# Patient Record
Sex: MALE | URBAN - METROPOLITAN AREA
[De-identification: names, ages, dates, MRNs, and addresses within clinical notes are randomized per-mention and may not be internally consistent; named-entity substitution may affect disease eponyms.]

---

## 2024-04-26 ENCOUNTER — OFFICE VISIT (OUTPATIENT)
Age: 39
End: 2024-04-26

## 2024-04-26 DIAGNOSIS — M25.532 LEFT WRIST PAIN: Primary | ICD-10-CM

## 2024-04-27 NOTE — PROGRESS NOTES
4/27/2024      CC: left wrist pain    HPI:      This is a 38 y.o. year old male who complains of left wrist pain.  Onset was sudden with a fall 7 weeks ago. This pain has been present for seven weeks.  Treatment so far has consisted of nothing.  The patient currently rates the pain as moderate.  Other associated injuries or pains: none.      PMH:  No past medical history on file.    PSxHx:  No past surgical history on file.    Meds:  No current outpatient medications on file.    All:  Not on File    Social Hx:  Social History     Socioeconomic History    Marital status:        Family Hx:  No family history on file.      Review of Systems:       General: Denies headache, lethargy, fever, weight loss  Ears/Nose/Throat: Denies ear discharge, drainage, nosebleeds, hoarse voice, dental problems  Cardiovascular: Denies chest pain, shortness of breath  Lungs: Denies chest pain, breathing problems, wheezing, pneumonia  Stomach: Denies stomach pain, heartburn, constipation, irritable bowel  Skin: Denies rash, sores, open wounds  Musculoskeletal: left wrist pain  Genitourinary: Denies dysuria, hematuria, polyuria  Gastrointestinal: Denies constipation, obstipation, diarrhea  Neurological: Denies changes in sight, smell, hearing, taste, seizures. Denies loss of consciousness.  Psychiatric: Denies depression, sleep pattern changes, anxiety, change in personality  Endocrine: Denies mood swings, heat or cold intolerance  Hematologic/Lymphatic: Denies anemia, purpura, petechia  Allergic/Immunologic: Denies swelling of throat, pain or swelling at lymph nodes      Physical Examination:    There were no vitals filed for this visit.     General: AOX3, no apparent distress  Psychiatric: mood and affect appropriate  Lungs: breathing is symmetric and unlabored bilaterally  Heart: regular rate and rhythm  Abdomen: no guarding  Head: normocephalic, atraumatic  Skin: No significant abnormalities, good turgor  Sensation intact to

## 2024-07-09 DIAGNOSIS — M25.532 LEFT WRIST PAIN: Primary | ICD-10-CM
